# Patient Record
Sex: MALE | Race: WHITE | NOT HISPANIC OR LATINO | URBAN - NONMETROPOLITAN AREA
[De-identification: names, ages, dates, MRNs, and addresses within clinical notes are randomized per-mention and may not be internally consistent; named-entity substitution may affect disease eponyms.]

---

## 2020-06-03 ENCOUNTER — OFFICE VISIT (OUTPATIENT)
Dept: URBAN - NONMETROPOLITAN AREA CLINIC 2 | Facility: CLINIC | Age: 75
End: 2020-06-03
Payer: MEDICARE

## 2020-06-03 DIAGNOSIS — K51.80 CHRONIC PANCOLONIC ULCERATIVE COLITIS: ICD-10-CM

## 2020-06-03 PROCEDURE — 99213 OFFICE O/P EST LOW 20 MIN: CPT | Performed by: INTERNAL MEDICINE

## 2020-06-03 RX ORDER — SERTRALINE 100 MG/1
TAKE 1 TABLET (100 MG) BY ORAL ROUTE ONCE DAILY TABLET, FILM COATED ORAL 1
Qty: 0 | Refills: 0 | Status: ACTIVE | COMMUNITY
Start: 1900-01-01

## 2020-06-03 RX ORDER — QUETIAPINE FUMARATE 100 MG/1
TAKE 1 TABLET (100 MG) BY ORAL ROUTE 2 TIMES PER DAY TABLET, FILM COATED ORAL 2
Qty: 0 | Refills: 0 | Status: ACTIVE | COMMUNITY
Start: 1900-01-01

## 2020-06-03 RX ORDER — AMLODIPINE BESYLATE 5 MG/1
TAKE 1 TABLET (5 MG) BY ORAL ROUTE ONCE DAILY TABLET ORAL 1
Qty: 0 | Refills: 0 | Status: ACTIVE | COMMUNITY
Start: 1900-01-01

## 2020-06-03 RX ORDER — LISINOPRIL 40 MG/1
TAKE 1 TABLET (40 MG) BY ORAL ROUTE ONCE DAILY TABLET ORAL 1
Qty: 0 | Refills: 0 | Status: ACTIVE | COMMUNITY
Start: 1900-01-01

## 2020-06-03 RX ORDER — BUSPIRONE HYDROCHLORIDE 10 MG/1
TAKE 1 TABLET (10 MG) BY ORAL ROUTE 2 TIMES PER DAY TABLET ORAL 2
Qty: 0 | Refills: 0 | Status: ACTIVE | COMMUNITY
Start: 1900-01-01

## 2020-06-03 RX ORDER — ASPIRIN 81 MG/1
TAKE 1 TABLET (81 MG) BY ORAL ROUTE ONCE DAILY TABLET, COATED ORAL 1
Qty: 0 | Refills: 0 | Status: ACTIVE | COMMUNITY
Start: 1900-01-01

## 2020-06-03 RX ORDER — OMEPRAZOLE 40 MG/1
CAPSULE, DELAYED RELEASE PELLETS ORAL
Qty: 0 | Refills: 0 | Status: ACTIVE | COMMUNITY
Start: 1900-01-01

## 2020-06-03 RX ORDER — ATORVASTATIN CALCIUM 20 MG/1
TAKE 1 TABLET (20 MG) BY ORAL ROUTE ONCE DAILY TABLET, FILM COATED ORAL 1
Qty: 0 | Refills: 0 | Status: ACTIVE | COMMUNITY
Start: 1900-01-01

## 2020-06-03 RX ORDER — LAMOTRIGINE 100 MG/1
TAKE 1 TABLET (100 MG) BY ORAL ROUTE ONCE DAILY TABLET ORAL 1
Qty: 0 | Refills: 0 | Status: ACTIVE | COMMUNITY
Start: 1900-01-01

## 2020-06-03 NOTE — HPI-TODAY'S VISIT:
How Severe Are Your Spot(S)?: mild Mr. Mckeon returns for outpatient follow-up of his ulcerative colitis.  He is doing quite well today.  He remains on low-dose mesalamine, [facility].  He denies any colitis symptoms.  He denies any bloody stool.  He denies any diarrhea.  He denies any abdominal pain or weight loss.  He is tolerating his medications well.  He really has no concerns or questions today What Is The Reason For Today's Visit?: Full Body Skin Examination What Is The Reason For Today's Visit? (Being Monitored For X): concerning skin lesions on an annual basis

## 2020-12-01 ENCOUNTER — OFFICE VISIT (OUTPATIENT)
Dept: URBAN - NONMETROPOLITAN AREA CLINIC 13 | Facility: CLINIC | Age: 75
End: 2020-12-01
Payer: MEDICARE

## 2020-12-01 DIAGNOSIS — K51.90 ULCERATIVE COLITIS: ICD-10-CM

## 2020-12-01 PROCEDURE — 99213 OFFICE O/P EST LOW 20 MIN: CPT | Performed by: INTERNAL MEDICINE

## 2020-12-01 RX ORDER — ASPIRIN 81 MG/1
TAKE 1 TABLET (81 MG) BY ORAL ROUTE ONCE DAILY TABLET, COATED ORAL 1
Qty: 0 | Refills: 0 | Status: ACTIVE | COMMUNITY
Start: 1900-01-01

## 2020-12-01 RX ORDER — AMLODIPINE BESYLATE 5 MG/1
TAKE 1 TABLET (5 MG) BY ORAL ROUTE ONCE DAILY TABLET ORAL 1
Qty: 0 | Refills: 0 | Status: ACTIVE | COMMUNITY
Start: 1900-01-01

## 2020-12-01 RX ORDER — BUSPIRONE HYDROCHLORIDE 10 MG/1
TAKE 1 TABLET (10 MG) BY ORAL ROUTE 2 TIMES PER DAY TABLET ORAL 2
Qty: 0 | Refills: 0 | Status: ACTIVE | COMMUNITY
Start: 1900-01-01

## 2020-12-01 RX ORDER — OMEPRAZOLE 40 MG/1
CAPSULE, DELAYED RELEASE PELLETS ORAL
Qty: 0 | Refills: 0 | Status: ACTIVE | COMMUNITY
Start: 1900-01-01

## 2020-12-01 RX ORDER — LISINOPRIL 40 MG/1
TAKE 1 TABLET (40 MG) BY ORAL ROUTE ONCE DAILY TABLET ORAL 1
Qty: 0 | Refills: 0 | Status: ACTIVE | COMMUNITY
Start: 1900-01-01

## 2020-12-01 RX ORDER — QUETIAPINE FUMARATE 100 MG/1
TAKE 1 TABLET (100 MG) BY ORAL ROUTE 2 TIMES PER DAY TABLET, FILM COATED ORAL 2
Qty: 0 | Refills: 0 | Status: ACTIVE | COMMUNITY
Start: 1900-01-01

## 2020-12-01 RX ORDER — SERTRALINE 100 MG/1
TAKE 1 TABLET (100 MG) BY ORAL ROUTE ONCE DAILY TABLET, FILM COATED ORAL 1
Qty: 0 | Refills: 0 | Status: ACTIVE | COMMUNITY
Start: 1900-01-01

## 2020-12-01 RX ORDER — LAMOTRIGINE 100 MG/1
TAKE 1 TABLET (100 MG) BY ORAL ROUTE ONCE DAILY TABLET ORAL 1
Qty: 0 | Refills: 0 | Status: ACTIVE | COMMUNITY
Start: 1900-01-01

## 2020-12-01 RX ORDER — ATORVASTATIN CALCIUM 20 MG/1
TAKE 1 TABLET (20 MG) BY ORAL ROUTE ONCE DAILY TABLET, FILM COATED ORAL 1
Qty: 0 | Refills: 0 | Status: ACTIVE | COMMUNITY
Start: 1900-01-01

## 2020-12-01 NOTE — HPI-TODAY'S VISIT:
Mr. Mckeon returns for outpatient follow-up of his ulcerative colitis.  He is doing quite well today.  He remains on low-dose mesalamine, [facility].  He denies any colitis symptoms.  He denies any bloody stool.  He denies any diarrhea.  He denies any abdominal pain or weight loss.  He is tolerating his medications well.  He really has no concerns or questions today Mr. Mckeon returns today for ulcerative colitis.  He remains on low-dose mesalamine.  He is not having any bloody stool or abdominal pain.  He is having some mild loose watery stools and some incontinence.  This is a new issue for him.  He does take a number of medications.  He denies any weight loss or other GI issues. Mr. Mckeon returns today for ulcerative colitis.  He remains on low-dose mesalamine.  He is not having any bloody stool or abdominal pain.  He is having some mild loose watery stools and some incontinence.  This is a new issue for him.  He does take a number of medications.  He denies any weight loss or other GI issues.

## 2021-06-01 ENCOUNTER — OFFICE VISIT (OUTPATIENT)
Dept: URBAN - NONMETROPOLITAN AREA CLINIC 13 | Facility: CLINIC | Age: 76
End: 2021-06-01
Payer: MEDICARE

## 2021-06-01 ENCOUNTER — WEB ENCOUNTER (OUTPATIENT)
Dept: URBAN - NONMETROPOLITAN AREA CLINIC 13 | Facility: CLINIC | Age: 76
End: 2021-06-01

## 2021-06-01 VITALS
HEART RATE: 64 BPM | SYSTOLIC BLOOD PRESSURE: 107 MMHG | WEIGHT: 232 LBS | TEMPERATURE: 97.8 F | DIASTOLIC BLOOD PRESSURE: 62 MMHG | BODY MASS INDEX: 27.39 KG/M2 | HEIGHT: 77 IN

## 2021-06-01 DIAGNOSIS — K51.90 ULCERATIVE COLITIS: ICD-10-CM

## 2021-06-01 PROCEDURE — 99214 OFFICE O/P EST MOD 30 MIN: CPT | Performed by: INTERNAL MEDICINE

## 2021-06-01 RX ORDER — OMEPRAZOLE 40 MG/1
CAPSULE, DELAYED RELEASE PELLETS ORAL
Qty: 0 | Refills: 0 | Status: ACTIVE | COMMUNITY
Start: 1900-01-01

## 2021-06-01 RX ORDER — LISINOPRIL 40 MG/1
TAKE 1 TABLET (40 MG) BY ORAL ROUTE ONCE DAILY TABLET ORAL 1
Qty: 0 | Refills: 0 | Status: ACTIVE | COMMUNITY
Start: 1900-01-01

## 2021-06-01 RX ORDER — BUSPIRONE HYDROCHLORIDE 10 MG/1
TAKE 1 TABLET (10 MG) BY ORAL ROUTE 2 TIMES PER DAY TABLET ORAL 2
Qty: 0 | Refills: 0 | Status: ACTIVE | COMMUNITY
Start: 1900-01-01

## 2021-06-01 RX ORDER — SERTRALINE 100 MG/1
TAKE 1 TABLET (100 MG) BY ORAL ROUTE ONCE DAILY TABLET, FILM COATED ORAL 1
Qty: 0 | Refills: 0 | Status: ACTIVE | COMMUNITY
Start: 1900-01-01

## 2021-06-01 RX ORDER — ATORVASTATIN CALCIUM 20 MG/1
TAKE 1 TABLET (20 MG) BY ORAL ROUTE ONCE DAILY TABLET, FILM COATED ORAL 1
Qty: 0 | Refills: 0 | Status: ACTIVE | COMMUNITY
Start: 1900-01-01

## 2021-06-01 RX ORDER — LAMOTRIGINE 100 MG/1
TAKE 1 TABLET (100 MG) BY ORAL ROUTE ONCE DAILY TABLET ORAL 1
Qty: 0 | Refills: 0 | Status: ACTIVE | COMMUNITY
Start: 1900-01-01

## 2021-06-01 RX ORDER — AMLODIPINE BESYLATE 5 MG/1
TAKE 1 TABLET (5 MG) BY ORAL ROUTE ONCE DAILY TABLET ORAL 1
Qty: 0 | Refills: 0 | Status: ACTIVE | COMMUNITY
Start: 1900-01-01

## 2021-06-01 RX ORDER — QUETIAPINE FUMARATE 100 MG/1
TAKE 1 TABLET (100 MG) BY ORAL ROUTE 2 TIMES PER DAY TABLET, FILM COATED ORAL 2
Qty: 0 | Refills: 0 | Status: ACTIVE | COMMUNITY
Start: 1900-01-01

## 2021-06-01 RX ORDER — ASPIRIN 81 MG/1
TAKE 1 TABLET (81 MG) BY ORAL ROUTE ONCE DAILY TABLET, COATED ORAL 1
Qty: 0 | Refills: 0 | Status: ACTIVE | COMMUNITY
Start: 1900-01-01

## 2021-06-01 NOTE — HPI-TODAY'S VISIT:
Mr. Mckeon returns for outpatient follow-up of his ulcerative colitis.  He is doing quite well today.  He remains on low-dose mesalamine, [facility].  He denies any colitis symptoms.  He denies any bloody stool.  He denies any diarrhea.  He denies any abdominal pain or weight loss.  He is tolerating his medications well.  He really has no concerns or questions today  Mr. Mckeon returns today for ulcerative colitis.  He remains on low-dose mesalamine.  He is not having any bloody stool or abdominal pain.  He is having some mild loose watery stools and some incontinence.  This is a new issue for him.  He does take a number of medications.  He denies any weight loss or other GI issues.

## 2022-05-05 ENCOUNTER — OFFICE VISIT (OUTPATIENT)
Dept: URBAN - NONMETROPOLITAN AREA CLINIC 13 | Facility: CLINIC | Age: 77
End: 2022-05-05
Payer: MEDICARE

## 2022-05-05 DIAGNOSIS — K51.90 ULCERATIVE COLITIS: ICD-10-CM

## 2022-05-05 PROCEDURE — 99214 OFFICE O/P EST MOD 30 MIN: CPT | Performed by: INTERNAL MEDICINE

## 2022-05-05 RX ORDER — AMLODIPINE BESYLATE 5 MG/1
TAKE 1 TABLET (5 MG) BY ORAL ROUTE ONCE DAILY TABLET ORAL 1
Qty: 0 | Refills: 0 | Status: ACTIVE | COMMUNITY
Start: 1900-01-01

## 2022-05-05 RX ORDER — LISINOPRIL 40 MG/1
TAKE 1 TABLET (40 MG) BY ORAL ROUTE ONCE DAILY TABLET ORAL 1
Qty: 0 | Refills: 0 | Status: ACTIVE | COMMUNITY
Start: 1900-01-01

## 2022-05-05 RX ORDER — BUSPIRONE HYDROCHLORIDE 10 MG/1
TAKE 1 TABLET (10 MG) BY ORAL ROUTE 2 TIMES PER DAY TABLET ORAL 2
Qty: 0 | Refills: 0 | Status: ACTIVE | COMMUNITY
Start: 1900-01-01

## 2022-05-05 RX ORDER — OMEPRAZOLE 40 MG/1
CAPSULE, DELAYED RELEASE PELLETS ORAL
Qty: 0 | Refills: 0 | Status: ACTIVE | COMMUNITY
Start: 1900-01-01

## 2022-05-05 RX ORDER — SERTRALINE 100 MG/1
TAKE 1 TABLET (100 MG) BY ORAL ROUTE ONCE DAILY TABLET, FILM COATED ORAL 1
Qty: 0 | Refills: 0 | Status: ACTIVE | COMMUNITY
Start: 1900-01-01

## 2022-05-05 RX ORDER — QUETIAPINE FUMARATE 100 MG/1
TAKE 1 TABLET (100 MG) BY ORAL ROUTE 2 TIMES PER DAY TABLET, FILM COATED ORAL 2
Qty: 0 | Refills: 0 | Status: ACTIVE | COMMUNITY
Start: 1900-01-01

## 2022-05-05 RX ORDER — LAMOTRIGINE 100 MG/1
TAKE 1 TABLET (100 MG) BY ORAL ROUTE ONCE DAILY TABLET ORAL 1
Qty: 0 | Refills: 0 | Status: ACTIVE | COMMUNITY
Start: 1900-01-01

## 2022-05-05 RX ORDER — ATORVASTATIN CALCIUM 20 MG/1
TAKE 1 TABLET (20 MG) BY ORAL ROUTE ONCE DAILY TABLET, FILM COATED ORAL 1
Qty: 0 | Refills: 0 | Status: ACTIVE | COMMUNITY
Start: 1900-01-01

## 2022-05-05 RX ORDER — ASPIRIN 81 MG/1
TAKE 1 TABLET (81 MG) BY ORAL ROUTE ONCE DAILY TABLET, COATED ORAL 1
Qty: 0 | Refills: 0 | Status: ACTIVE | COMMUNITY
Start: 1900-01-01

## 2022-05-05 NOTE — HPI-TODAY'S VISIT:
Mr. Mckeon returns for outpatient follow-up of his ulcerative colitis.  He is doing quite well today.  He remains on low-dose mesalamine, [facility].  He denies any colitis symptoms.  He denies any bloody stool.  He denies any diarrhea.  He denies any abdominal pain or weight loss.  He is tolerating his medications well.  He really has no concerns or questions today  Mr. Mckeon returns today for ulcerative colitis.  He remains on low-dose mesalamine.  He is not having any bloody stool or abdominal pain.  He is having some mild loose watery stools and some incontinence.  This is a new issue for him.  He does take a number of medications.  He denies any weight loss or other GI issues. 5/5/2022 Mr. Mckeon returns for follow-up of ulcerative colitis.  He is actually been doing well since last visit.  He was a little late getting his Lialda refill and decided to hold off taking his medications.  Since that time he has been doing well with minimal diarrhea bloody stools or pain.  He has not been taking his medicine for some time.  He is due for colonoscopy this year.

## 2022-08-23 ENCOUNTER — CLAIMS CREATED FROM THE CLAIM WINDOW (OUTPATIENT)
Dept: URBAN - METROPOLITAN AREA CLINIC 4 | Facility: CLINIC | Age: 77
End: 2022-08-23
Payer: MEDICARE

## 2022-08-23 ENCOUNTER — OFFICE VISIT (OUTPATIENT)
Dept: URBAN - NONMETROPOLITAN AREA SURGERY CENTER 1 | Facility: SURGERY CENTER | Age: 77
End: 2022-08-23
Payer: MEDICARE

## 2022-08-23 DIAGNOSIS — D12.3 ADENOMA OF TRANSVERSE COLON: ICD-10-CM

## 2022-08-23 DIAGNOSIS — K51.50 CHRONIC LEFT-SIDED ULCERATIVE COLITIS: ICD-10-CM

## 2022-08-23 DIAGNOSIS — D12.3 BENIGN NEOPLASM OF TRANSVERSE COLON: ICD-10-CM

## 2022-08-23 DIAGNOSIS — K63.89 OTHER SPECIFIED DISEASES OF INTESTINE: ICD-10-CM

## 2022-08-23 PROCEDURE — 88305 TISSUE EXAM BY PATHOLOGIST: CPT | Performed by: PATHOLOGY

## 2022-08-23 PROCEDURE — 45380 COLONOSCOPY AND BIOPSY: CPT | Performed by: INTERNAL MEDICINE

## 2022-08-23 PROCEDURE — G8907 PT DOC NO EVENTS ON DISCHARG: HCPCS | Performed by: INTERNAL MEDICINE

## 2022-09-22 ENCOUNTER — OFFICE VISIT (OUTPATIENT)
Dept: URBAN - NONMETROPOLITAN AREA CLINIC 13 | Facility: CLINIC | Age: 77
End: 2022-09-22
Payer: MEDICARE

## 2022-09-22 VITALS
BODY MASS INDEX: 26.92 KG/M2 | SYSTOLIC BLOOD PRESSURE: 103 MMHG | DIASTOLIC BLOOD PRESSURE: 60 MMHG | HEART RATE: 90 BPM | WEIGHT: 228 LBS | HEIGHT: 77 IN

## 2022-09-22 DIAGNOSIS — K51.90 ULCERATIVE COLITIS: ICD-10-CM

## 2022-09-22 PROBLEM — 64766004 ULCERATIVE COLITIS: Status: ACTIVE | Noted: 2021-06-01

## 2022-09-22 PROCEDURE — 99213 OFFICE O/P EST LOW 20 MIN: CPT | Performed by: INTERNAL MEDICINE

## 2022-09-22 RX ORDER — QUETIAPINE FUMARATE 100 MG/1
TAKE 1 TABLET (100 MG) BY ORAL ROUTE 2 TIMES PER DAY TABLET, FILM COATED ORAL 2
Qty: 0 | Refills: 0 | Status: ACTIVE | COMMUNITY
Start: 1900-01-01

## 2022-09-22 RX ORDER — OMEPRAZOLE 40 MG/1
CAPSULE, DELAYED RELEASE PELLETS ORAL
Qty: 0 | Refills: 0 | Status: ACTIVE | COMMUNITY
Start: 1900-01-01

## 2022-09-22 RX ORDER — LAMOTRIGINE 100 MG/1
TAKE 1 TABLET (100 MG) BY ORAL ROUTE ONCE DAILY TABLET ORAL 1
Qty: 0 | Refills: 0 | Status: ACTIVE | COMMUNITY
Start: 1900-01-01

## 2022-09-22 RX ORDER — AMLODIPINE BESYLATE 5 MG/1
TAKE 1 TABLET (5 MG) BY ORAL ROUTE ONCE DAILY TABLET ORAL 1
Qty: 0 | Refills: 0 | Status: ACTIVE | COMMUNITY
Start: 1900-01-01

## 2022-09-22 RX ORDER — BUSPIRONE HYDROCHLORIDE 10 MG/1
TAKE 1 TABLET (10 MG) BY ORAL ROUTE 2 TIMES PER DAY TABLET ORAL 2
Qty: 0 | Refills: 0 | Status: ACTIVE | COMMUNITY
Start: 1900-01-01

## 2022-09-22 RX ORDER — LISINOPRIL 40 MG/1
TAKE 1 TABLET (40 MG) BY ORAL ROUTE ONCE DAILY TABLET ORAL 1
Qty: 0 | Refills: 0 | Status: ACTIVE | COMMUNITY
Start: 1900-01-01

## 2022-09-22 RX ORDER — ATORVASTATIN CALCIUM 20 MG/1
TAKE 1 TABLET (20 MG) BY ORAL ROUTE ONCE DAILY TABLET, FILM COATED ORAL 1
Qty: 0 | Refills: 0 | Status: ACTIVE | COMMUNITY
Start: 1900-01-01

## 2022-09-22 RX ORDER — SERTRALINE 100 MG/1
TAKE 1 TABLET (100 MG) BY ORAL ROUTE ONCE DAILY TABLET, FILM COATED ORAL 1
Qty: 0 | Refills: 0 | Status: ACTIVE | COMMUNITY
Start: 1900-01-01

## 2022-09-22 RX ORDER — ASPIRIN 81 MG/1
TAKE 1 TABLET (81 MG) BY ORAL ROUTE ONCE DAILY TABLET, COATED ORAL 1
Qty: 0 | Refills: 0 | Status: ACTIVE | COMMUNITY
Start: 1900-01-01

## 2022-09-22 NOTE — HPI-TODAY'S VISIT:
Mr. Mckeon returns for outpatient follow-up of his ulcerative colitis.  He is doing quite well today.  He remains on low-dose mesalamine, [facility].  He denies any colitis symptoms.  He denies any bloody stool.  He denies any diarrhea.  He denies any abdominal pain or weight loss.  He is tolerating his medications well.  He really has no concerns or questions today  Mr. Mckeon returns today for ulcerative colitis.  He remains on low-dose mesalamine.  He is not having any bloody stool or abdominal pain.  He is having some mild loose watery stools and some incontinence.  This is a new issue for him.  He does take a number of medications.  He denies any weight loss or other GI issues. 5/5/2022 Mr. Mckeon returns for follow-up of ulcerative colitis.  He is actually been doing well since last visit.  He was a little late getting his Lialda refill and decided to hold off taking his medications.  Since that time he has been doing well with minimal diarrhea bloody stools or pain.  He has not been taking his medicine for some time.  He is due for colonoscopy this year. Mr. Mckoen returns for outpatient follow-up of his ulcerative colitis.  He is doing quite well today.  He remains on low-dose mesalamine, [facility].  He denies any colitis symptoms.  He denies any bloody stool.  He denies any diarrhea.  He denies any abdominal pain or weight loss.  He is tolerating his medications well.  He really has no concerns or questions today  Mr. Mckeon returns today for ulcerative colitis.  He remains on low-dose mesalamine.  He is not having any bloody stool or abdominal pain.  He is having some mild loose watery stools and some incontinence.  This is a new issue for him.  He does take a number of medications.  He denies any weight loss or other GI issues. Mr. Mckeon returns for outpatient follow-up of his ulcerative colitis.  He is doing quite well today.  He remains on low-dose mesalamine, [facility].  He denies any colitis symptoms.  He denies any bloody stool.  He denies any diarrhea.  He denies any abdominal pain or weight loss.  He is tolerating his medications well.  He really has no concerns or questions today Mr. Mckeon returns today for ulcerative colitis.  He remains on low-dose mesalamine.  He is not having any bloody stool or abdominal pain.  He is having some mild loose watery stools and some incontinence.  This is a new issue for him.  He does take a number of medications.  He denies any weight loss or other GI issues. Mr. Mckeon returns today for ulcerative colitis.  He remains on low-dose mesalamine.  He is not having any bloody stool or abdominal pain.  He is having some mild loose watery stools and some incontinence.  This is a new issue for him.  He does take a number of medications.  He denies any weight loss or other GI issues. Mr. Mckeon returns for outpatient follow-up of his ulcerative colitis.  He is doing quite well today.  He remains on low-dose mesalamine, [facility].  He denies any colitis symptoms.  He denies any bloody stool.  He denies any diarrhea.  He denies any abdominal pain or weight loss.  He is tolerating his medications well.  He really has no concerns or questions today 9/22/22 Mr. Allison returns for follow-up after colonoscopy.  He has a history of mild ulcerative colitis.  He had been maintained on mesalamine in the past but stopped this medicine because it made him feel bad.  Recent colonoscopy showed 1 small tubular adenoma but no evidence of active colitis.  Biopsies were normal.  He is doing well today.  He has no symptoms and no complaints.  He would prefer to remain off medications if possible

## 2023-09-21 ENCOUNTER — WEB ENCOUNTER (OUTPATIENT)
Dept: URBAN - NONMETROPOLITAN AREA CLINIC 13 | Facility: CLINIC | Age: 78
End: 2023-09-21

## 2023-09-21 ENCOUNTER — OFFICE VISIT (OUTPATIENT)
Dept: URBAN - NONMETROPOLITAN AREA CLINIC 13 | Facility: CLINIC | Age: 78
End: 2023-09-21
Payer: MEDICARE

## 2023-09-21 ENCOUNTER — DASHBOARD ENCOUNTERS (OUTPATIENT)
Age: 78
End: 2023-09-21

## 2023-09-21 VITALS
HEIGHT: 77 IN | DIASTOLIC BLOOD PRESSURE: 71 MMHG | SYSTOLIC BLOOD PRESSURE: 130 MMHG | WEIGHT: 233.4 LBS | HEART RATE: 80 BPM | BODY MASS INDEX: 27.56 KG/M2

## 2023-09-21 DIAGNOSIS — K51.80 CHRONIC PANCOLONIC ULCERATIVE COLITIS: ICD-10-CM

## 2023-09-21 PROCEDURE — 99213 OFFICE O/P EST LOW 20 MIN: CPT | Performed by: INTERNAL MEDICINE

## 2023-09-21 RX ORDER — QUETIAPINE FUMARATE 100 MG/1
TAKE 1 TABLET (100 MG) BY ORAL ROUTE 2 TIMES PER DAY TABLET, FILM COATED ORAL 2
Qty: 0 | Refills: 0 | Status: ACTIVE | COMMUNITY
Start: 1900-01-01

## 2023-09-21 RX ORDER — ASPIRIN 81 MG/1
TAKE 1 TABLET (81 MG) BY ORAL ROUTE ONCE DAILY TABLET, COATED ORAL 1
Qty: 0 | Refills: 0 | Status: ACTIVE | COMMUNITY
Start: 1900-01-01

## 2023-09-21 RX ORDER — SERTRALINE 100 MG/1
TAKE 1 TABLET (100 MG) BY ORAL ROUTE ONCE DAILY TABLET, FILM COATED ORAL 1
Qty: 0 | Refills: 0 | Status: ACTIVE | COMMUNITY
Start: 1900-01-01

## 2023-09-21 RX ORDER — AMLODIPINE BESYLATE 5 MG/1
TAKE 1 TABLET (5 MG) BY ORAL ROUTE ONCE DAILY TABLET ORAL 1
Qty: 0 | Refills: 0 | Status: ACTIVE | COMMUNITY
Start: 1900-01-01

## 2023-09-21 RX ORDER — LAMOTRIGINE 100 MG/1
TAKE 1 TABLET (100 MG) BY ORAL ROUTE ONCE DAILY TABLET ORAL 1
Qty: 0 | Refills: 0 | Status: ACTIVE | COMMUNITY
Start: 1900-01-01

## 2023-09-21 RX ORDER — BUSPIRONE HYDROCHLORIDE 10 MG/1
TAKE 1 TABLET (10 MG) BY ORAL ROUTE 2 TIMES PER DAY TABLET ORAL 2
Qty: 0 | Refills: 0 | Status: ACTIVE | COMMUNITY
Start: 1900-01-01

## 2023-09-21 RX ORDER — ATORVASTATIN CALCIUM 20 MG/1
TAKE 1 TABLET (20 MG) BY ORAL ROUTE ONCE DAILY TABLET, FILM COATED ORAL 1
Qty: 0 | Refills: 0 | Status: ACTIVE | COMMUNITY
Start: 1900-01-01

## 2023-09-21 RX ORDER — OMEPRAZOLE 40 MG/1
CAPSULE, DELAYED RELEASE PELLETS ORAL
Qty: 0 | Refills: 0 | Status: ACTIVE | COMMUNITY
Start: 1900-01-01

## 2023-09-21 RX ORDER — LISINOPRIL 40 MG/1
TAKE 1 TABLET (40 MG) BY ORAL ROUTE ONCE DAILY TABLET ORAL 1
Qty: 0 | Refills: 0 | Status: ACTIVE | COMMUNITY
Start: 1900-01-01

## 2023-09-21 NOTE — HPI-TODAY'S VISIT:
Mr. Mckeon returns for outpatient follow-up of his ulcerative colitis.  He is doing quite well today.  He remains on low-dose mesalamine, [facility].  He denies any colitis symptoms.  He denies any bloody stool.  He denies any diarrhea.  He denies any abdominal pain or weight loss.  He is tolerating his medications well.  He really has no concerns or questions today  Mr. Mckeon returns today for ulcerative colitis.  He remains on low-dose mesalamine.  He is not having any bloody stool or abdominal pain.  He is having some mild loose watery stools and some incontinence.  This is a new issue for him.  He does take a number of medications.  He denies any weight loss or other GI issues. 5/5/2022 Mr. Mckeon returns for follow-up of ulcerative colitis.  He is actually been doing well since last visit.  He was a little late getting his Lialda refill and decided to hold off taking his medications.  Since that time he has been doing well with minimal diarrhea bloody stools or pain.  He has not been taking his medicine for some time.  He is due for colonoscopy this year. Mr. Mckeon returns for outpatient follow-up of his ulcerative colitis.  He is doing quite well today.  He remains on low-dose mesalamine, [facility].  He denies any colitis symptoms.  He denies any bloody stool.  He denies any diarrhea.  He denies any abdominal pain or weight loss.  He is tolerating his medications well.  He really has no concerns or questions today  Mr. Mckeon returns today for ulcerative colitis.  He remains on low-dose mesalamine.  He is not having any bloody stool or abdominal pain.  He is having some mild loose watery stools and some incontinence.  This is a new issue for him.  He does take a number of medications.  He denies any weight loss or other GI issues. Mr. Mckeon returns for outpatient follow-up of his ulcerative colitis.  He is doing quite well today.  He remains on low-dose mesalamine, [facility].  He denies any colitis symptoms.  He denies any bloody stool.  He denies any diarrhea.  He denies any abdominal pain or weight loss.  He is tolerating his medications well.  He really has no concerns or questions today Mr. Mckeon returns today for ulcerative colitis.  He remains on low-dose mesalamine.  He is not having any bloody stool or abdominal pain.  He is having some mild loose watery stools and some incontinence.  This is a new issue for him.  He does take a number of medications.  He denies any weight loss or other GI issues. Mr. Mckeon returns today for ulcerative colitis.  He remains on low-dose mesalamine.  He is not having any bloody stool or abdominal pain.  He is having some mild loose watery stools and some incontinence.  This is a new issue for him.  He does take a number of medications.  He denies any weight loss or other GI issues. Mr. Mckeon returns for outpatient follow-up of his ulcerative colitis.  He is doing quite well today.  He remains on low-dose mesalamine, [facility].  He denies any colitis symptoms.  He denies any bloody stool.  He denies any diarrhea.  He denies any abdominal pain or weight loss.  He is tolerating his medications well.  He really has no concerns or questions today 9/22/22 Mr. Allison returns for follow-up after colonoscopy.  He has a history of mild ulcerative colitis.  He had been maintained on mesalamine in the past but stopped this medicine because it made him feel bad.  Recent colonoscopy showed 1 small tubular adenoma but no evidence of active colitis.  Biopsies were normal.  He is doing well today.  He has no symptoms and no complaints.  He would prefer to remain off medications if possible 9/21/2023 Mr. Carbajal returns for follow-up today.  He has ulcerative colitis and has been maintained in the past on mesalamine.  Recent colonoscopy was unrevealing other than a small polyp.  He had stopped his medications and had chose to not continue them.  Last visit because he was doing so well he decided to keep him off his medications.  Since that time he has done well.  He denies any abdominal pain, rectal bleeding, diarrhea or any other GI symptoms.  He continues to want to hold off medications at this time.

## 2024-09-26 ENCOUNTER — OFFICE VISIT (OUTPATIENT)
Dept: URBAN - NONMETROPOLITAN AREA CLINIC 13 | Facility: CLINIC | Age: 79
End: 2024-09-26
Payer: MEDICARE

## 2024-09-26 VITALS
HEIGHT: 77 IN | HEART RATE: 108 BPM | WEIGHT: 230.2 LBS | BODY MASS INDEX: 27.18 KG/M2 | SYSTOLIC BLOOD PRESSURE: 102 MMHG | DIASTOLIC BLOOD PRESSURE: 67 MMHG

## 2024-09-26 DIAGNOSIS — K51.90 ULCERATIVE COLITIS: ICD-10-CM

## 2024-09-26 PROCEDURE — 99214 OFFICE O/P EST MOD 30 MIN: CPT | Performed by: INTERNAL MEDICINE

## 2024-09-26 RX ORDER — OMEPRAZOLE 40 MG/1
CAPSULE, DELAYED RELEASE PELLETS ORAL
Qty: 0 | Refills: 0 | Status: ACTIVE | COMMUNITY
Start: 1900-01-01

## 2024-09-26 RX ORDER — QUETIAPINE FUMARATE 100 MG/1
TAKE 1 TABLET (100 MG) BY ORAL ROUTE 2 TIMES PER DAY TABLET, FILM COATED ORAL 2
Qty: 0 | Refills: 0 | Status: ACTIVE | COMMUNITY
Start: 1900-01-01

## 2024-09-26 RX ORDER — ATORVASTATIN CALCIUM 20 MG/1
TAKE 1 TABLET (20 MG) BY ORAL ROUTE ONCE DAILY TABLET, FILM COATED ORAL 1
Qty: 0 | Refills: 0 | Status: ACTIVE | COMMUNITY
Start: 1900-01-01

## 2024-09-26 RX ORDER — ASPIRIN 81 MG/1
TAKE 1 TABLET (81 MG) BY ORAL ROUTE ONCE DAILY TABLET, COATED ORAL 1
Qty: 0 | Refills: 0 | Status: ACTIVE | COMMUNITY
Start: 1900-01-01

## 2024-09-26 RX ORDER — SERTRALINE 100 MG/1
TAKE 1 TABLET (100 MG) BY ORAL ROUTE ONCE DAILY TABLET, FILM COATED ORAL 1
Qty: 0 | Refills: 0 | Status: ACTIVE | COMMUNITY
Start: 1900-01-01

## 2024-09-26 RX ORDER — AMLODIPINE BESYLATE 5 MG/1
TAKE 1 TABLET (5 MG) BY ORAL ROUTE ONCE DAILY TABLET ORAL 1
Qty: 0 | Refills: 0 | Status: ACTIVE | COMMUNITY
Start: 1900-01-01

## 2024-09-26 RX ORDER — LISINOPRIL 40 MG/1
TAKE 1 TABLET (40 MG) BY ORAL ROUTE ONCE DAILY TABLET ORAL 1
Qty: 0 | Refills: 0 | Status: ACTIVE | COMMUNITY
Start: 1900-01-01

## 2024-09-26 RX ORDER — LAMOTRIGINE 100 MG/1
TAKE 1 TABLET (100 MG) BY ORAL ROUTE ONCE DAILY TABLET ORAL 1
Qty: 0 | Refills: 0 | Status: ACTIVE | COMMUNITY
Start: 1900-01-01

## 2024-09-26 RX ORDER — BUSPIRONE HYDROCHLORIDE 10 MG/1
TAKE 1 TABLET (10 MG) BY ORAL ROUTE 2 TIMES PER DAY TABLET ORAL 2
Qty: 0 | Refills: 0 | Status: ACTIVE | COMMUNITY
Start: 1900-01-01

## 2024-09-26 NOTE — HPI-TODAY'S VISIT:
Mr. Mckeon returns for outpatient follow-up of his ulcerative colitis.  He is doing quite well today.  He remains on low-dose mesalamine, [facility].  He denies any colitis symptoms.  He denies any bloody stool.  He denies any diarrhea.  He denies any abdominal pain or weight loss.  He is tolerating his medications well.  He really has no concerns or questions today  Mr. Mckeon returns today for ulcerative colitis.  He remains on low-dose mesalamine.  He is not having any bloody stool or abdominal pain.  He is having some mild loose watery stools and some incontinence.  This is a new issue for him.  He does take a number of medications.  He denies any weight loss or other GI issues. 5/5/2022 Mr. Mckeon returns for follow-up of ulcerative colitis.  He is actually been doing well since last visit.  He was a little late getting his Lialda refill and decided to hold off taking his medications.  Since that time he has been doing well with minimal diarrhea bloody stools or pain.  He has not been taking his medicine for some time.  He is due for colonoscopy this year. Mr. Mckeon returns for outpatient follow-up of his ulcerative colitis.  He is doing quite well today.  He remains on low-dose mesalamine, [facility].  He denies any colitis symptoms.  He denies any bloody stool.  He denies any diarrhea.  He denies any abdominal pain or weight loss.  He is tolerating his medications well.  He really has no concerns or questions today  Mr. Mckeon returns today for ulcerative colitis.  He remains on low-dose mesalamine.  He is not having any bloody stool or abdominal pain.  He is having some mild loose watery stools and some incontinence.  This is a new issue for him.  He does take a number of medications.  He denies any weight loss or other GI issues. Mr. Mckeon returns for outpatient follow-up of his ulcerative colitis.  He is doing quite well today.  He remains on low-dose mesalamine, [facility].  He denies any colitis symptoms.  He denies any bloody stool.  He denies any diarrhea.  He denies any abdominal pain or weight loss.  He is tolerating his medications well.  He really has no concerns or questions today Mr. Mckeon returns today for ulcerative colitis.  He remains on low-dose mesalamine.  He is not having any bloody stool or abdominal pain.  He is having some mild loose watery stools and some incontinence.  This is a new issue for him.  He does take a number of medications.  He denies any weight loss or other GI issues. Mr. Mckeon returns today for ulcerative colitis.  He remains on low-dose mesalamine.  He is not having any bloody stool or abdominal pain.  He is having some mild loose watery stools and some incontinence.  This is a new issue for him.  He does take a number of medications.  He denies any weight loss or other GI issues. Mr. Mckeon returns for outpatient follow-up of his ulcerative colitis.  He is doing quite well today.  He remains on low-dose mesalamine, [facility].  He denies any colitis symptoms.  He denies any bloody stool.  He denies any diarrhea.  He denies any abdominal pain or weight loss.  He is tolerating his medications well.  He really has no concerns or questions today 9/22/22 Mr. Allison returns for follow-up after colonoscopy.  He has a history of mild ulcerative colitis.  He had been maintained on mesalamine in the past but stopped this medicine because it made him feel bad.  Recent colonoscopy showed 1 small tubular adenoma but no evidence of active colitis.  Biopsies were normal.  He is doing well today.  He has no symptoms and no complaints.  He would prefer to remain off medications if possible 9/21/2023 Mr. Carbajal returns for follow-up today.  He has ulcerative colitis and has been maintained in the past on mesalamine.  Recent colonoscopy was unrevealing other than a small polyp.  He had stopped his medications and had chose to not continue them.  Last visit because he was doing so well he decided to keep him off his medications.  Since that time he has done well.  He denies any abdominal pain, rectal bleeding, diarrhea or any other GI symptoms.  He continues to want to hold off medications at this time. 9/26/2024 Mr. Carbajal returns for follow-up for ulcerative colitis.  He is doing well off of medication.  He is not having any symptoms.  He has occasional altered constipation and diarrhea but denies any bloody stool or abdominal pain.  He does have some occasional bloating.  He denies any concerning symptoms at this time.  He is not due for colonoscopy until next year